# Patient Record
Sex: FEMALE | Race: BLACK OR AFRICAN AMERICAN | NOT HISPANIC OR LATINO
[De-identification: names, ages, dates, MRNs, and addresses within clinical notes are randomized per-mention and may not be internally consistent; named-entity substitution may affect disease eponyms.]

---

## 2021-06-11 PROBLEM — Z00.00 ENCOUNTER FOR PREVENTIVE HEALTH EXAMINATION: Status: ACTIVE | Noted: 2021-06-11

## 2021-06-24 DIAGNOSIS — Z78.9 OTHER SPECIFIED HEALTH STATUS: ICD-10-CM

## 2021-06-24 RX ORDER — ASCORBIC ACID 500 MG
TABLET ORAL DAILY
Refills: 0 | Status: ACTIVE | COMMUNITY

## 2021-06-24 RX ORDER — CHOLECALCIFEROL (VITAMIN D3) 25 MCG
TABLET ORAL DAILY
Refills: 0 | Status: ACTIVE | COMMUNITY

## 2021-06-25 ENCOUNTER — NON-APPOINTMENT (OUTPATIENT)
Age: 51
End: 2021-06-25

## 2021-06-25 ENCOUNTER — APPOINTMENT (OUTPATIENT)
Dept: CARDIOLOGY | Facility: CLINIC | Age: 51
End: 2021-06-25
Payer: COMMERCIAL

## 2021-06-25 VITALS
OXYGEN SATURATION: 100 % | WEIGHT: 191 LBS | HEART RATE: 58 BPM | BODY MASS INDEX: 30.7 KG/M2 | DIASTOLIC BLOOD PRESSURE: 89 MMHG | HEIGHT: 66 IN | SYSTOLIC BLOOD PRESSURE: 133 MMHG

## 2021-06-25 DIAGNOSIS — R00.2 PALPITATIONS: ICD-10-CM

## 2021-06-25 DIAGNOSIS — Z82.3 FAMILY HISTORY OF STROKE: ICD-10-CM

## 2021-06-25 DIAGNOSIS — Z78.9 OTHER SPECIFIED HEALTH STATUS: ICD-10-CM

## 2021-06-25 PROCEDURE — 99072 ADDL SUPL MATRL&STAF TM PHE: CPT

## 2021-06-25 PROCEDURE — 93000 ELECTROCARDIOGRAM COMPLETE: CPT

## 2021-06-25 PROCEDURE — 99204 OFFICE O/P NEW MOD 45 MIN: CPT

## 2021-06-26 PROBLEM — Z82.3 FAMILY HISTORY OF STROKE: Status: ACTIVE | Noted: 2021-06-26

## 2021-06-26 PROBLEM — Z78.9 KNOWN HEALTH PROBLEMS: NONE: Status: RESOLVED | Noted: 2021-06-26 | Resolved: 2021-06-26

## 2021-06-26 PROBLEM — R00.2 PALPITATION: Status: ACTIVE | Noted: 2021-06-24

## 2021-06-26 NOTE — HISTORY OF PRESENT ILLNESS
[FreeTextEntry1] : Ms. Gomez presents to the office today for an initial cardiovascular evaluation.\par \par Ms. Gomez is a 50 year old female with no significant past medical history.  She has previously undergone a  section.  She has no history of hypertension, hyperlipidemia, or diabetes mellitus.\par \par Ms. Gomez reports that she has been experiencing episodic palpitations for the last two months or so.  She experiences the palpitations as the sensation of skipped beats, although sometimes it feels like her heart is racing.  Ms. Gomez reports that the palpitations generally occur several times per week and can occur either spontaneously or in response to physical exertion.  There has been no associated presyncope or syncope.  Ms. Gomez denies having any chest pain or dyspnea either at rest or with exertion.  In addition, there has been no orthopnea, paroxysmal nocturnal dyspnea, or edema.

## 2021-06-26 NOTE — DISCUSSION/SUMMARY
[FreeTextEntry1] : In summary, Ms. Gomez is a 50 year old female with no significant past medical history.  She has previously undergone a  section.  She has no history of hypertension, hyperlipidemia, or diabetes mellitus.\par \par Ms. Gomez presents with an approximate two month history of experiencing frequent palpitations of uncertain etiology.  I suggested to her that we proceed with a transthoracic echocardiogram in addition to a Holter monitor for further evaluation.  Ms. Gomez is in agreement with this plan and will schedule the echocardiogram and Holter monitor.  She will follow up with me once I obtain the results of these studies.

## 2021-07-02 ENCOUNTER — OUTPATIENT (OUTPATIENT)
Dept: OUTPATIENT SERVICES | Facility: HOSPITAL | Age: 51
LOS: 1 days | End: 2021-07-02

## 2021-07-02 ENCOUNTER — APPOINTMENT (OUTPATIENT)
Dept: CV DIAGNOSITCS | Facility: HOSPITAL | Age: 51
End: 2021-07-02
Payer: COMMERCIAL

## 2021-07-02 DIAGNOSIS — R00.2 PALPITATIONS: ICD-10-CM

## 2021-07-02 PROCEDURE — 93306 TTE W/DOPPLER COMPLETE: CPT | Mod: 26
